# Patient Record
Sex: FEMALE
[De-identification: names, ages, dates, MRNs, and addresses within clinical notes are randomized per-mention and may not be internally consistent; named-entity substitution may affect disease eponyms.]

---

## 2021-07-02 PROBLEM — Z00.00 ENCOUNTER FOR PREVENTIVE HEALTH EXAMINATION: Status: ACTIVE | Noted: 2021-07-02

## 2023-04-28 ENCOUNTER — ASOB RESULT (OUTPATIENT)
Age: 23
End: 2023-04-28

## 2023-04-28 ENCOUNTER — APPOINTMENT (OUTPATIENT)
Dept: ANTEPARTUM | Facility: CLINIC | Age: 23
End: 2023-04-28
Payer: COMMERCIAL

## 2023-04-28 PROCEDURE — 76813 OB US NUCHAL MEAS 1 GEST: CPT

## 2023-04-28 PROCEDURE — 76801 OB US < 14 WKS SINGLE FETUS: CPT | Mod: 59

## 2023-06-30 ENCOUNTER — APPOINTMENT (OUTPATIENT)
Dept: ANTEPARTUM | Facility: CLINIC | Age: 23
End: 2023-06-30
Payer: COMMERCIAL

## 2023-06-30 ENCOUNTER — ASOB RESULT (OUTPATIENT)
Age: 23
End: 2023-06-30

## 2023-06-30 PROCEDURE — 76811 OB US DETAILED SNGL FETUS: CPT

## 2023-09-05 ENCOUNTER — ASOB RESULT (OUTPATIENT)
Age: 23
End: 2023-09-05

## 2023-09-05 ENCOUNTER — APPOINTMENT (OUTPATIENT)
Dept: ANTEPARTUM | Facility: CLINIC | Age: 23
End: 2023-09-05
Payer: COMMERCIAL

## 2023-09-05 PROCEDURE — 76816 OB US FOLLOW-UP PER FETUS: CPT

## 2023-10-11 ENCOUNTER — APPOINTMENT (OUTPATIENT)
Dept: MATERNAL FETAL MEDICINE | Facility: CLINIC | Age: 23
End: 2023-10-11
Payer: COMMERCIAL

## 2023-10-11 ENCOUNTER — ASOB RESULT (OUTPATIENT)
Age: 23
End: 2023-10-11

## 2023-10-11 DIAGNOSIS — O24.419 GESTATIONAL DIABETES MELLITUS IN PREGNANCY, UNSPECIFIED CONTROL: ICD-10-CM

## 2023-10-11 PROCEDURE — G0109 DIAB MANAGE TRN IND/GROUP: CPT

## 2023-10-11 RX ORDER — BLOOD-GLUCOSE METER
KIT MISCELLANEOUS 4 TIMES DAILY
Qty: 2 | Refills: 2 | Status: ACTIVE | COMMUNITY
Start: 2023-10-11 | End: 1900-01-01

## 2023-10-11 RX ORDER — LANCETS 33 GAUGE
EACH MISCELLANEOUS
Qty: 4 | Refills: 2 | Status: ACTIVE | COMMUNITY
Start: 2023-10-11 | End: 1900-01-01

## 2023-10-11 RX ORDER — URINE ACETONE TEST STRIPS
STRIP MISCELLANEOUS
Qty: 1 | Refills: 2 | Status: ACTIVE | COMMUNITY
Start: 2023-10-11 | End: 1900-01-01

## 2023-10-11 RX ORDER — BLOOD-GLUCOSE METER
W/DEVICE KIT MISCELLANEOUS
Qty: 1 | Refills: 0 | Status: ACTIVE | COMMUNITY
Start: 2023-10-11 | End: 1900-01-01

## 2023-10-17 ENCOUNTER — ASOB RESULT (OUTPATIENT)
Age: 23
End: 2023-10-17

## 2023-10-17 ENCOUNTER — APPOINTMENT (OUTPATIENT)
Dept: ANTEPARTUM | Facility: CLINIC | Age: 23
End: 2023-10-17
Payer: COMMERCIAL

## 2023-10-17 PROCEDURE — 76816 OB US FOLLOW-UP PER FETUS: CPT

## 2023-10-18 ENCOUNTER — ASOB RESULT (OUTPATIENT)
Age: 23
End: 2023-10-18

## 2023-10-18 ENCOUNTER — APPOINTMENT (OUTPATIENT)
Dept: MATERNAL FETAL MEDICINE | Facility: CLINIC | Age: 23
End: 2023-10-18
Payer: COMMERCIAL

## 2023-10-18 PROCEDURE — G0108 DIAB MANAGE TRN  PER INDIV: CPT | Mod: 95

## 2023-10-26 ENCOUNTER — TRANSCRIPTION ENCOUNTER (OUTPATIENT)
Age: 23
End: 2023-10-26

## 2023-10-26 ENCOUNTER — APPOINTMENT (OUTPATIENT)
Dept: MATERNAL FETAL MEDICINE | Facility: CLINIC | Age: 23
End: 2023-10-26
Payer: COMMERCIAL

## 2023-10-26 ENCOUNTER — ASOB RESULT (OUTPATIENT)
Age: 23
End: 2023-10-26

## 2023-10-26 PROCEDURE — G0108 DIAB MANAGE TRN  PER INDIV: CPT

## 2023-11-03 ENCOUNTER — APPOINTMENT (OUTPATIENT)
Dept: MATERNAL FETAL MEDICINE | Facility: CLINIC | Age: 23
End: 2023-11-03

## 2023-11-14 ENCOUNTER — APPOINTMENT (OUTPATIENT)
Dept: ANTEPARTUM | Facility: CLINIC | Age: 23
End: 2023-11-14

## 2024-10-16 ENCOUNTER — APPOINTMENT (OUTPATIENT)
Dept: ANTEPARTUM | Facility: CLINIC | Age: 24
End: 2024-10-16

## 2024-10-22 ENCOUNTER — OUTPATIENT (OUTPATIENT)
Dept: OUTPATIENT SERVICES | Facility: HOSPITAL | Age: 24
LOS: 1 days | End: 2024-10-22
Payer: COMMERCIAL

## 2024-10-22 VITALS
SYSTOLIC BLOOD PRESSURE: 132 MMHG | TEMPERATURE: 99 F | HEART RATE: 101 BPM | DIASTOLIC BLOOD PRESSURE: 73 MMHG | RESPIRATION RATE: 17 BRPM

## 2024-10-22 DIAGNOSIS — O26.899 OTHER SPECIFIED PREGNANCY RELATED CONDITIONS, UNSPECIFIED TRIMESTER: ICD-10-CM

## 2024-10-22 DIAGNOSIS — Z78.9 OTHER SPECIFIED HEALTH STATUS: Chronic | ICD-10-CM

## 2024-10-22 PROCEDURE — G0463: CPT

## 2024-10-22 PROCEDURE — 59025 FETAL NON-STRESS TEST: CPT | Mod: 26

## 2024-10-22 PROCEDURE — 76819 FETAL BIOPHYS PROFIL W/O NST: CPT

## 2024-10-22 PROCEDURE — 59025 FETAL NON-STRESS TEST: CPT

## 2024-10-22 PROCEDURE — 76819 FETAL BIOPHYS PROFIL W/O NST: CPT | Mod: 26

## 2024-10-22 PROCEDURE — 99212 OFFICE O/P EST SF 10 MIN: CPT | Mod: 25

## 2024-10-22 NOTE — OB PROVIDER TRIAGE NOTE - NSOBPROVIDERNOTE_OBGYN_ALL_OB_FT
23 y/o  38 1/7 weeks GA (TOMASA: 2024) sent in by Dr. Steinberg for NST/BPP. Patient had NST/BPP at another hospital with non reassuring results.     - VSS  - NST Reactive  - Pt not in active labor  - BPP Pending 25 y/o  38 1/7 weeks GA (TOMASA: 2024) sent in by Dr. Steinberg for NST/BPP. Patient had NST/BPP at another hospital with non reassuring results.     - VSS  - NST Reactive  - Pt not in active labor  - BPP Pending  - Spoke with Dr. Steinberg via Telephone 23 y/o  38 1/7 weeks GA (TOMASA: 2024) sent in by Dr. Steinberg for NST/BPP. Patient had NST/BPP at another hospital with non reassuring results.     - VSS  - NST Reactive  - Pt not in active labor  - BPP Pending  - Spoke with Dr. Steinberg via Telephone      addendum:   reviewed nst with Dr. Betancourt cat I, reactive  bpp  jeff 8.13 posterior placenta, cephalic, dw Dr. Steinberg is attempting to arrange an appt with [facility], patient has been going [facility] labor room triage for prenatal care and established care with Dr. Steinberg at 33 weeks. fundal height measuring 36-37 weeks, today patient is 38 weeks  will instructed patient to come to labor room on Thursday 10/24 for nst/bpp at 9am if she does not have an appt with mfm  please drink 2 Liters of water daily  labor precautions given  kick count instructions given  return to labor room with any maternal/fetal concerns

## 2024-10-22 NOTE — OB RN TRIAGE NOTE - NS_TRIAGEADDITIONAL COMMENTS_OBGYN_ALL_OB_FT
Official BPP was reviewed by ANH Cordon. Same is 8/8. with CARMELO of 8.13. Case was discussed by Dr Dugan.. Patient will be discharged home with labor precautions and instructions to follow up here on Thursday 10/24/24 for . Reinforced labor precautions and kick counts. Patient is advised to return to labor room if SROM, vaginal bleeding, Decreased fetal movements and or uterine contractions every 3-5 minutes x one hour.

## 2024-10-22 NOTE — OB PROVIDER TRIAGE NOTE - NSHPPHYSICALEXAM_GEN_ALL_CORE
Vital Signs Last 24 Hrs  T(C): 37 (22 Oct 2024 16:13), Max: 37 (22 Oct 2024 16:13)  T(F): 98.6 (22 Oct 2024 16:13), Max: 98.6 (22 Oct 2024 16:13)  HR: 101 (22 Oct 2024 16:13) (101 - 101)  BP: 132/73 (22 Oct 2024 16:13) (132/73 - 132/73)  RR: 17 (22 Oct 2024 16:13) (17 - 17)    Physical Exam:  General: A & O x 3, in NAD  Abdomen: Gravid Uterus, soft, nontender  Extremities: Nontender, no discoloration or swelling    FHT:  Baseline 140, moderate variability, Category 1 tracing, reactive

## 2024-10-22 NOTE — OB PROVIDER TRIAGE NOTE - NS_CONTRACTPATTER_OBGYN_ALL_OB
[Normocephalic] : normocephalic [Atraumatic] : atraumatic [EOMI] : extra ocular movement intact [Sclera nonicteric] : sclera nonicteric [Supple] : supple [No Supraclavicular Adenopathy] : no supraclavicular adenopathy [Clear to Auscultation Bilat] : clear to auscultation bilaterally [Normal Sinus Rhythm] : normal sinus rhythm [No Rubs] : no pericardial rub [Examined in the supine and seated position] : examined in the supine and seated position [Symmetrical] : symmetrical [Bra Size: ___] : Bra Size: [unfilled] [No dominant masses] : no dominant masses in right breast  [No dominant masses] : no dominant masses left breast [No Nipple Retraction] : no left nipple retraction [No Nipple Discharge] : no left nipple discharge [No Axillary Lymphadenopathy] : no left axillary lymphadenopathy [Soft] : abdomen soft [Not Tender] : non-tender Irregular [No Edema] : no edema [No Rashes] : no rashes [No Ulceration] : no ulceration

## 2024-10-22 NOTE — OB PROVIDER TRIAGE NOTE - ADDITIONAL INSTRUCTIONS
cat I, reactive  bpp 8/8 jeff 8.13 posterior placenta, cephalic  will instructed patient to come to labor room on Thursday 10/24 for nst/bpp at 9am if she does not have an appt with m  please drink 2 Liters of water daily  labor precautions given  kick count instructions given  return to labor room with any maternal/fetal concerns

## 2024-10-22 NOTE — OB RN TRIAGE NOTE - FALL HARM RISK - UNIVERSAL INTERVENTIONS
Bed in lowest position, wheels locked, appropriate side rails in place/Call bell, personal items and telephone in reach/Instruct patient to call for assistance before getting out of bed or chair/Non-slip footwear when patient is out of bed/[place] to call system/Purposeful Proactive Rounding/Room/bathroom lighting operational, light cord in reach

## 2024-10-22 NOTE — OB PROVIDER TRIAGE NOTE - HISTORY OF PRESENT ILLNESS
23 y/o  38 1/7 weeks GA (TOMASA: ) sent in by Dr. Steinberg for NST/BPP. Patient had NST/BPP at another hospital with non reassuring results.   Patient has +FM, denies vaginal bleeding, denies LOF, denies contractions.  Denies fever, chills, headache, SOB, chest pain, N/V/D.     Patient seen under PA Wendy.     PNC: Dr. Steinberg, complicated by Anemia (treated with po iron and iron infusion x1 yesterday)  OBHx:  in  at 38 weeks 4 days. TOP in 2019 with meds, TOP in  with D & C  GYNHx: H/o fibroid, h/o HSV 1 & 2 in , h/o chlamydia, h/o trichomonas  PMHx: Sjogrens Syndrome  PSHx: none  Allergies: Peanuts, pollen  Meds: PNV, Vit B12, po iron, iron infusion x1 yesterday

## 2024-10-24 ENCOUNTER — INPATIENT (INPATIENT)
Facility: HOSPITAL | Age: 24
LOS: 3 days | Discharge: HOME | DRG: 951 | End: 2024-10-28
Attending: STUDENT IN AN ORGANIZED HEALTH CARE EDUCATION/TRAINING PROGRAM | Admitting: STUDENT IN AN ORGANIZED HEALTH CARE EDUCATION/TRAINING PROGRAM
Payer: COMMERCIAL

## 2024-10-24 VITALS
RESPIRATION RATE: 15 BRPM | SYSTOLIC BLOOD PRESSURE: 131 MMHG | HEART RATE: 101 BPM | DIASTOLIC BLOOD PRESSURE: 83 MMHG | TEMPERATURE: 98 F

## 2024-10-24 DIAGNOSIS — Z78.9 OTHER SPECIFIED HEALTH STATUS: Chronic | ICD-10-CM

## 2024-10-24 DIAGNOSIS — Z34.80 ENCOUNTER FOR SUPERVISION OF OTHER NORMAL PREGNANCY, UNSPECIFIED TRIMESTER: ICD-10-CM

## 2024-10-24 DIAGNOSIS — O26.899 OTHER SPECIFIED PREGNANCY RELATED CONDITIONS, UNSPECIFIED TRIMESTER: ICD-10-CM

## 2024-10-24 LAB
ANISOCYTOSIS BLD QL: SLIGHT — SIGNIFICANT CHANGE UP
APTT BLD: 29.9 SEC — SIGNIFICANT CHANGE UP (ref 24.5–35.6)
BASOPHILS # BLD AUTO: 0 K/UL — SIGNIFICANT CHANGE UP (ref 0–0.2)
BASOPHILS NFR BLD AUTO: 0 % — SIGNIFICANT CHANGE UP (ref 0–2)
BLD GP AB SCN SERPL QL: SIGNIFICANT CHANGE UP
EOSINOPHIL # BLD AUTO: 0.12 K/UL — SIGNIFICANT CHANGE UP (ref 0–0.5)
EOSINOPHIL NFR BLD AUTO: 2 % — SIGNIFICANT CHANGE UP (ref 0–6)
FIBRINOGEN PPP-MCNC: 676 MG/DL — HIGH (ref 200–475)
HCT VFR BLD CALC: 32.6 % — LOW (ref 34.5–45)
HGB BLD-MCNC: 10.1 G/DL — LOW (ref 11.5–15.5)
HIV 1 & 2 AB SERPL IA.RAPID: SIGNIFICANT CHANGE UP
INR BLD: 0.88 RATIO — SIGNIFICANT CHANGE UP (ref 0.85–1.16)
LG PLATELETS BLD QL AUTO: SLIGHT — SIGNIFICANT CHANGE UP
LYMPHOCYTES # BLD AUTO: 0.89 K/UL — LOW (ref 1–3.3)
LYMPHOCYTES # BLD AUTO: 15 % — SIGNIFICANT CHANGE UP (ref 13–44)
MANUAL SMEAR VERIFICATION: SIGNIFICANT CHANGE UP
MCHC RBC-ENTMCNC: 24 PG — LOW (ref 27–34)
MCHC RBC-ENTMCNC: 31 GM/DL — LOW (ref 32–36)
MCV RBC AUTO: 77.4 FL — LOW (ref 80–100)
METAMYELOCYTES # FLD: 1 % — HIGH (ref 0–0)
MICROCYTES BLD QL: SLIGHT — SIGNIFICANT CHANGE UP
MONOCYTES # BLD AUTO: 0.35 K/UL — SIGNIFICANT CHANGE UP (ref 0–0.9)
MONOCYTES NFR BLD AUTO: 6 % — SIGNIFICANT CHANGE UP (ref 2–14)
MYELOCYTES NFR BLD: 1 % — HIGH (ref 0–0)
NEUTROPHILS # BLD AUTO: 4.37 K/UL — SIGNIFICANT CHANGE UP (ref 1.8–7.4)
NEUTROPHILS NFR BLD AUTO: 72 % — SIGNIFICANT CHANGE UP (ref 43–77)
NEUTS BAND # BLD: 2 % — SIGNIFICANT CHANGE UP (ref 0–8)
NRBC # BLD: 0 /100 WBCS — SIGNIFICANT CHANGE UP (ref 0–0)
PLAT MORPH BLD: NORMAL — SIGNIFICANT CHANGE UP
PLATELET # BLD AUTO: 258 K/UL — SIGNIFICANT CHANGE UP (ref 150–400)
POIKILOCYTOSIS BLD QL AUTO: SLIGHT — SIGNIFICANT CHANGE UP
PROTHROM AB SERPL-ACNC: 10.3 SEC — SIGNIFICANT CHANGE UP (ref 9.9–13.4)
RBC # BLD: 4.21 M/UL — SIGNIFICANT CHANGE UP (ref 3.8–5.2)
RBC # FLD: 20.2 % — HIGH (ref 10.3–14.5)
RBC BLD AUTO: ABNORMAL
SPHEROCYTES BLD QL SMEAR: SLIGHT — SIGNIFICANT CHANGE UP
VARIANT LYMPHS # BLD: 1 % — SIGNIFICANT CHANGE UP (ref 0–6)
WBC # BLD: 5.9 K/UL — SIGNIFICANT CHANGE UP (ref 3.8–10.5)
WBC # FLD AUTO: 5.9 K/UL — SIGNIFICANT CHANGE UP (ref 3.8–10.5)

## 2024-10-24 PROCEDURE — 76815 OB US LIMITED FETUS(S): CPT | Mod: 26

## 2024-10-24 PROCEDURE — 76819 FETAL BIOPHYS PROFIL W/O NST: CPT | Mod: 26

## 2024-10-24 RX ORDER — OXYTOCIN IN D5W-0.2% SODIUM CL 15/250 ML
167 PLASTIC BAG, INJECTION (ML) INTRAVENOUS
Qty: 30 | Refills: 0 | Status: DISCONTINUED | OUTPATIENT
Start: 2024-10-24 | End: 2024-10-28

## 2024-10-24 RX ORDER — OXYTOCIN IN D5W-0.2% SODIUM CL 15/250 ML
2 PLASTIC BAG, INJECTION (ML) INTRAVENOUS
Qty: 30 | Refills: 0 | Status: DISCONTINUED | OUTPATIENT
Start: 2024-10-24 | End: 2024-10-25

## 2024-10-24 RX ORDER — CITRIC ACID/SODIUM CITRATE 334-500MG
15 SOLUTION, ORAL ORAL EVERY 6 HOURS
Refills: 0 | Status: DISCONTINUED | OUTPATIENT
Start: 2024-10-24 | End: 2024-10-25

## 2024-10-24 RX ORDER — CHLORHEXIDINE GLUCONATE 40 MG/ML
1 SOLUTION TOPICAL DAILY
Refills: 0 | Status: DISCONTINUED | OUTPATIENT
Start: 2024-10-24 | End: 2024-10-25

## 2024-10-24 RX ORDER — INFLUENZ VIR VAC TV P-SURF2003 15MCG/.5ML
0.5 SYRINGE (ML) INTRAMUSCULAR ONCE
Refills: 0 | Status: DISCONTINUED | OUTPATIENT
Start: 2024-10-24 | End: 2024-10-28

## 2024-10-24 RX ADMIN — Medication 2 MILLIUNIT(S)/MIN: at 21:10

## 2024-10-24 RX ADMIN — Medication 125 MILLILITER(S): at 18:30

## 2024-10-24 NOTE — OB PROVIDER TRIAGE NOTE - NSHPPHYSICALEXAM_GEN_ALL_CORE
Vital Signs Last 24 Hrs  T(C): 36.9 (24 Oct 2024 14:13), Max: 36.9 (24 Oct 2024 14:13)  T(F): 98.5 (24 Oct 2024 14:13), Max: 98.5 (24 Oct 2024 14:13)  HR: 101 (24 Oct 2024 14:13) (101 - 101)  BP: 131/83 (24 Oct 2024 14:13) (131/83 - 131/83)  BP(mean): --  RR: 15 (24 Oct 2024 14:13) (15 - 15)  SpO2: --    gen aox3, not in acute distress, appears well  abd gravid, soft, nontender  sve indeterminant baseline, moderate variability  toco no contractions

## 2024-10-24 NOTE — OB PROVIDER H&P - HISTORY OF PRESENT ILLNESS
OB PA H&P    23 y/o  38 weeks 3 days TOMASA  LMP  presented for  testing. Patient was able to get an appt with M tomorrow.   Patient has +FM, denies vaginal bleeding, denies LOF, denies contractions.    PNC: Dr. Steinberg, late to care at 33wks, was going to [facility] labor room for prenatal care  complicated by Anemia (treated with po iron and iron infusion  OBHx:  in  at 38 weeks 4 days. TOP in 2019 with meds, TOP in  with D & C  GYNHx: H/o fibroid, h/o HSV 1 , h/o chlamydia, h/o trichomonas  PMHx: Sjogrens Syndrome  PSHx: none  Allergies: Peanuts, pollen  Meds: PNV, Vit B12, po iron, iron infusion x1 10/22

## 2024-10-24 NOTE — OB PROVIDER H&P - ASSESSMENT
a/p 23 y/o  38 weeks 3 days with nonreassuring nst, bpp 8/10 jeff 8.1, stable  instructed by Dr. Hollingsworth house attending to admit for delivery  reviewed findings with Dr. Steinberg, instructed to augment with pitocin  informed consent obtained by Dr. Hollingsworth  obtain prenatal record  nst reviewed by Dr. Elier GIL, agrees with plan  continuous monitoring  npo  venodynes  epidural for pain mngt upon request

## 2024-10-24 NOTE — OB PROVIDER H&P - NSHPLABSRESULTS_GEN_ALL_CORE
< from: US OB Position Fetus(es) (10.24.24 @ 16:31) >        INTERPRETATION:  CLINICAL INFORMATION: bpp, carmelo, fetal well being    GESTATIONAL AGE: 38 weeks 3 days.    COMPARISON: OB ultrasound 10/22/2024    TECHNIQUE:  Transabdominal pelvic sonogram only.    FINDINGS:    Single live Intrauterine gestation is present.  Fetal position: CEPHALIC presentation.  Placenta location: POSTERIOR .  Amniotic fluid volume: CARMELO 8.1 cm.  Fetal motion is seen in real-time and the fetal heart rate measures 126   bpm.    Fetal anatomy and umbilical cord were not evaluated.    Measurements are as follows:    BPD: 9.0 cmcorresponding to 36 weeks, 4 days.  HC: 32.4 cm corresponding to 36 weeks, 5 days.  AC: 32.9 cm corresponding to 36 weeks, 6 days.  FL: 7.3 cm corresponding to 37 weeks, 4 days.    Estimated fetal weight: 3081 g,   6 lbs. 13 oz.    Estimated fetal weight percent: 28.3%.    The biophysical profile is 8/8.    Body Movement: 2  (score 2) Greater or equal than 3 body/limb movements  (score 0) Less than 3 body/limb movements    Tone: 2  (score 2) One or more episodes extension/flexion  (score 0) No episodes extension/flexion    Breathing movements: 2  (score 2) Any breathing movements or hiccups  (score 0) No breathing movements    Amniotic fluid: 2  (score 2) One pocket >= 2cm in 2 perpendicular planes  (score 0) <2 X 2 cm pocket    Additional comments: None.    IMPRESSION:  Single live intrauterine gestation measuring 37 weeks and 0 days   corresponding to an estimated delivery date of 11/14/2024.    The biophysical profile is 8/8.    CARMELO on the low range.    < end of copied text >

## 2024-10-24 NOTE — OB PROVIDER H&P - NSPPHRISKSTATUS_OBGYN_ALL_OB
Quality 130: Documentation Of Current Medications In The Medical Record: Current Medications Documented
Quality 474: Zoster Vaccination Status: Shingrix Vaccination not Administered or Previously Received, Reason not Otherwise Specified
Detail Level: Detailed
Low Risk

## 2024-10-24 NOTE — OB PROVIDER H&P - NSHPPHYSICALEXAM_GEN_ALL_CORE
Vital Signs Last 24 Hrs  T(C): 36.9 (24 Oct 2024 17:57), Max: 36.9 (24 Oct 2024 14:13)  T(F): 98.4 (24 Oct 2024 17:57), Max: 98.5 (24 Oct 2024 14:13)  HR: 100 (24 Oct 2024 17:57) (100 - 101)  BP: 131/83 (24 Oct 2024 17:57) (131/83 - 131/83)  BP(mean): --  RR: 16 (24 Oct 2024 17:57) (15 - 16)  SpO2: --    gen aox3, not in acute distress, appears comfortable  abd gravid, soft, nontender  sve 2-3/70/-2/vtx/int  nst indeterminant baseline, moderate variability  toco occasional

## 2024-10-24 NOTE — OB PROVIDER TRIAGE NOTE - NSOBPROVIDERNOTE_OBGYN_ALL_OB_FT
a/p 25y/o  38 weeks 3 days for  testing,   prolonged nst  bpp with efw  reviewed nst with Dr. Edel sarabia attending

## 2024-10-24 NOTE — OB PROVIDER H&P - PROBLEM SELECTOR PLAN 1
a/p 25 y/o  38 weeks 3 days with nonreassuring nst, bpp 8/10, stable  instructed by Dr. Edel sarabia attending to admit for delivery  reviewed findings with Dr. Steinberg, instructed to augment with pitocin  informed consent obtained by Dr. Hollingsworth  obtain prenatal record  nst reviewed by Dr. Elier GIL, agrees with plan  continuous monitoring  npo  venodynes  epidural for pain mngt upon request

## 2024-10-24 NOTE — OB PROVIDER TRIAGE NOTE - HISTORY OF PRESENT ILLNESS
OB PA Triage Note     25 y/o  38 weeks 3 days TOMASA:  LMP  presented for  testing. Patient was able to get an appt with M tomorrow.   Patient has +FM, denies vaginal bleeding, denies LOF, denies contractions.          PNC: Dr. Steinberg, late to care, was going to [facility] labor room for prenatal care  complicated by Anemia (treated with po iron and iron infusion x1 yesterday)  OBHx:  in  at 38 weeks 4 days. TOP in  with meds, TOP in  with D & C  GYNHx: H/o fibroid, h/o HSV 1 & 2 in , h/o chlamydia, h/o trichomonas  PMHx: Sjogrens Syndrome  PSHx: none  Allergies: Peanuts, pollen  Meds: PNV, Vit B12, po iron, iron infusion x1 yesterday   OB PA Triage Note     23 y/o  38 weeks 3 days TOMASA  LMP  presented for  testing. Patient was able to get an appt with M tomorrow.   Patient has +FM, denies vaginal bleeding, denies LOF, denies contractions.    PNC: Dr. Steinberg, late to care, was going to [facility] labor room for prenatal care  complicated by Anemia (treated with po iron and iron infusion  OBHx:  in  at 38 weeks 4 days. TOP in  with meds, TOP in  with D & C  GYNHx: H/o fibroid, h/o HSV 1 & 2 in , h/o chlamydia, h/o trichomonas  PMHx: Sjogrens Syndrome  PSHx: none  Allergies: Peanuts, pollen  Meds: PNV, Vit B12, po iron, iron infusion x1 10/22

## 2024-10-25 ENCOUNTER — APPOINTMENT (OUTPATIENT)
Dept: ANTEPARTUM | Facility: CLINIC | Age: 24
End: 2024-10-25

## 2024-10-25 DIAGNOSIS — D64.9 ANEMIA, UNSPECIFIED: ICD-10-CM

## 2024-10-25 DIAGNOSIS — O99.891 OTHER SPECIFIED DISEASES AND CONDITIONS COMPLICATING PREGNANCY: ICD-10-CM

## 2024-10-25 DIAGNOSIS — D21.9 BENIGN NEOPLASM OF CONNECTIVE AND OTHER SOFT TISSUE, UNSPECIFIED: ICD-10-CM

## 2024-10-25 DIAGNOSIS — O36.8330 MATERNAL CARE FOR ABNORMALITIES OF THE FETAL HEART RATE OR RHYTHM, THIRD TRIMESTER, NOT APPLICABLE OR UNSPECIFIED: ICD-10-CM

## 2024-10-25 DIAGNOSIS — M35.00 SJOGREN SYNDROME, UNSPECIFIED: ICD-10-CM

## 2024-10-25 DIAGNOSIS — Z3A.38 38 WEEKS GESTATION OF PREGNANCY: ICD-10-CM

## 2024-10-25 DIAGNOSIS — O99.013 ANEMIA COMPLICATING PREGNANCY, THIRD TRIMESTER: ICD-10-CM

## 2024-10-25 LAB
HBV SURFACE AG SERPL QL IA: SIGNIFICANT CHANGE UP
HCV AB S/CO SERPL IA: 0.13 S/CO — SIGNIFICANT CHANGE UP (ref 0–0.99)
HCV AB SERPL-IMP: SIGNIFICANT CHANGE UP
HIV 1+2 AB+HIV1 P24 AG SERPL QL IA: SIGNIFICANT CHANGE UP
MEV IGG SER-ACNC: >300 AU/ML — SIGNIFICANT CHANGE UP
MEV IGG+IGM SER-IMP: POSITIVE — SIGNIFICANT CHANGE UP
MUV AB SER-ACNC: POSITIVE — SIGNIFICANT CHANGE UP
MUV IGG FLD-ACNC: 145 AU/ML — SIGNIFICANT CHANGE UP
RUBV IGG SER-ACNC: 8.83 INDEX — SIGNIFICANT CHANGE UP
RUBV IGG SER-IMP: POSITIVE — SIGNIFICANT CHANGE UP
T PALLIDUM AB TITR SER: NEGATIVE — SIGNIFICANT CHANGE UP

## 2024-10-25 PROCEDURE — 74018 RADEX ABDOMEN 1 VIEW: CPT | Mod: 26

## 2024-10-25 PROCEDURE — 88305 TISSUE EXAM BY PATHOLOGIST: CPT | Mod: 26

## 2024-10-25 RX ORDER — DEXAMETHASONE 1.5 MG 1.5 MG/1
4 TABLET ORAL EVERY 6 HOURS
Refills: 0 | Status: DISCONTINUED | OUTPATIENT
Start: 2024-10-25 | End: 2024-10-28

## 2024-10-25 RX ORDER — ONDANSETRON HYDROCHLORIDE 2 MG/ML
4 INJECTION, SOLUTION INTRAMUSCULAR; INTRAVENOUS EVERY 6 HOURS
Refills: 0 | Status: COMPLETED | OUTPATIENT
Start: 2024-10-25 | End: 2024-10-26

## 2024-10-25 RX ORDER — MAGNESIUM HYDROXIDE 1200 MG/15ML
30 SUSPENSION ORAL
Refills: 0 | Status: DISCONTINUED | OUTPATIENT
Start: 2024-10-25 | End: 2024-10-28

## 2024-10-25 RX ORDER — SIMETHICONE 80 MG/1
80 TABLET, CHEWABLE ORAL EVERY 4 HOURS
Refills: 0 | Status: DISCONTINUED | OUTPATIENT
Start: 2024-10-25 | End: 2024-10-28

## 2024-10-25 RX ORDER — IBUPROFEN 200 MG
600 TABLET ORAL EVERY 6 HOURS
Refills: 0 | Status: COMPLETED | OUTPATIENT
Start: 2024-10-25 | End: 2025-09-23

## 2024-10-25 RX ORDER — CEFAZOLIN SODIUM 1 G
2000 VIAL (EA) INJECTION ONCE
Refills: 0 | Status: COMPLETED | OUTPATIENT
Start: 2024-10-25 | End: 2024-10-25

## 2024-10-25 RX ORDER — CLOSTRIDIUM TETANI TOXOID ANTIGEN (FORMALDEHYDE INACTIVATED), CORYNEBACTERIUM DIPHTHERIAE TOXOID ANTIGEN (FORMALDEHYDE INACTIVATED), BORDETELLA PERTUSSIS TOXOID ANTIGEN (GLUTARALDEHYDE INACTIVATED), BORDETELLA PERTUSSIS FILAMENTOUS HEMAGGLUTININ ANTIGEN (FORMALDEHYDE INACTIVATED), BORDETELLA PERTUSSIS PERTACTIN ANTIGEN, AND BORDETELLA PERTUSSIS FIMBRIAE 2/3 ANTIGEN 5; 2; 2.5; 5; 3; 5 [LF]/.5ML; [LF]/.5ML; UG/.5ML; UG/.5ML; UG/.5ML; UG/.5ML
0.5 INJECTION, SUSPENSION INTRAMUSCULAR ONCE
Refills: 0 | Status: DISCONTINUED | OUTPATIENT
Start: 2024-10-25 | End: 2024-10-28

## 2024-10-25 RX ORDER — FERROUS SULFATE 325(65) MG
325 TABLET ORAL DAILY
Refills: 0 | Status: DISCONTINUED | OUTPATIENT
Start: 2024-10-25 | End: 2024-10-28

## 2024-10-25 RX ORDER — DIPHENHYDRAMINE HCL 12.5MG/5ML
25 ELIXIR ORAL EVERY 6 HOURS
Refills: 0 | Status: DISCONTINUED | OUTPATIENT
Start: 2024-10-25 | End: 2024-10-28

## 2024-10-25 RX ORDER — ASCORBIC ACID 500 MG
500 TABLET ORAL DAILY
Refills: 0 | Status: DISCONTINUED | OUTPATIENT
Start: 2024-10-25 | End: 2024-10-28

## 2024-10-25 RX ORDER — KETOROLAC TROMETHAMINE 30 MG/ML
30 INJECTION INTRAMUSCULAR; INTRAVENOUS EVERY 6 HOURS
Refills: 0 | Status: DISCONTINUED | OUTPATIENT
Start: 2024-10-25 | End: 2024-10-26

## 2024-10-25 RX ORDER — NALOXONE HYDROCHLORIDE 1 MG/ML
0.1 INJECTION, SOLUTION INTRAMUSCULAR; INTRAVENOUS; SUBCUTANEOUS
Refills: 0 | Status: DISCONTINUED | OUTPATIENT
Start: 2024-10-25 | End: 2024-10-28

## 2024-10-25 RX ORDER — MODIFIED LANOLIN
1 OINTMENT (GRAM) TOPICAL EVERY 6 HOURS
Refills: 0 | Status: DISCONTINUED | OUTPATIENT
Start: 2024-10-25 | End: 2024-10-28

## 2024-10-25 RX ORDER — OXYCODONE HYDROCHLORIDE 30 MG/1
5 TABLET ORAL
Refills: 0 | Status: COMPLETED | OUTPATIENT
Start: 2024-10-25 | End: 2024-11-01

## 2024-10-25 RX ORDER — MORPHINE SULFATE 30 MG/1
3 TABLET, EXTENDED RELEASE ORAL ONCE
Refills: 0 | Status: DISCONTINUED | OUTPATIENT
Start: 2024-10-25 | End: 2024-10-28

## 2024-10-25 RX ORDER — HEPARIN SODIUM 10000 [USP'U]/ML
5000 INJECTION INTRAVENOUS; SUBCUTANEOUS EVERY 12 HOURS
Refills: 0 | Status: DISCONTINUED | OUTPATIENT
Start: 2024-10-26 | End: 2024-10-28

## 2024-10-25 RX ORDER — ACETAMINOPHEN 500 MG
975 TABLET ORAL EVERY 6 HOURS
Refills: 0 | Status: DISCONTINUED | OUTPATIENT
Start: 2024-10-25 | End: 2024-10-28

## 2024-10-25 RX ADMIN — Medication 975 MILLIGRAM(S): at 18:31

## 2024-10-25 RX ADMIN — KETOROLAC TROMETHAMINE 30 MILLIGRAM(S): 30 INJECTION INTRAMUSCULAR; INTRAVENOUS at 21:35

## 2024-10-25 RX ADMIN — KETOROLAC TROMETHAMINE 30 MILLIGRAM(S): 30 INJECTION INTRAMUSCULAR; INTRAVENOUS at 14:50

## 2024-10-25 RX ADMIN — Medication 975 MILLIGRAM(S): at 19:31

## 2024-10-25 RX ADMIN — Medication 0.25 MILLIGRAM(S): at 11:09

## 2024-10-25 RX ADMIN — KETOROLAC TROMETHAMINE 30 MILLIGRAM(S): 30 INJECTION INTRAMUSCULAR; INTRAVENOUS at 23:30

## 2024-10-25 RX ADMIN — SIMETHICONE 80 MILLIGRAM(S): 80 TABLET, CHEWABLE ORAL at 18:33

## 2024-10-25 RX ADMIN — Medication 2 MILLIUNIT(S)/MIN: at 07:42

## 2024-10-25 RX ADMIN — Medication 100 MILLIGRAM(S): at 11:25

## 2024-10-25 RX ADMIN — KETOROLAC TROMETHAMINE 30 MILLIGRAM(S): 30 INJECTION INTRAMUSCULAR; INTRAVENOUS at 14:34

## 2024-10-25 NOTE — OB PROVIDER DELIVERY SUMMARY - NSPROVIDERDELIVERYNOTE_OBGYN_ALL_OB_FT
sectoin without complications, tight nuchal corn, baby with Apgars 9&9 at 1&5 min. Good hemostasis at end of procedure.

## 2024-10-25 NOTE — OB RN DELIVERY SUMMARY - NS_SEPSISRSKCALC_OBGYN_ALL_OB_FT
EOS calculated successfully. EOS Risk Factor: 0.5/1000 live births ([facility] national incidence); GA=38w4d; Temp=98.5; ROM=0.433; GBS='Unknown'; Antibiotics='No antibiotics or any antibiotics < 2 hrs prior to birth'

## 2024-10-25 NOTE — OB PROVIDER LABOR PROGRESS NOTE - NS_OBIHIFHRDETAILS_OBGYN_ALL_OB_FT
FHT: baseline: 130, moderate variability, + accels, no decels
Baseline 130, moderate variability +accels, no decels
fh
fh baseline 135 moderate variability +accels
FHT: baseline: 130, moderate variability, + accels,  two variable decelerations to 60 bpms lasting 1-2 minutes with spontaneous return to baseline

## 2024-10-25 NOTE — CHART NOTE - NSCHARTNOTEFT_GEN_A_CORE
Called by ANH Machuca at 11:19am. Was told that pt had a fetal bradycardiac event s/p AROM. Was informed that FHR went back to baseline and fetal deceleration was for 7 minutes. SV3: 7/80/-2. clear. Informed Brigette to bring pt to the main OR and monitor FHR in the OR. Was informed that house officer, Dr. Sánchez is present. Informed Brigette that I would leave my office and that I am 10 to 15 minutes away. Called a few minutes later stating that patient is brought to OR. Arrived to L&D at 11:36am.  Informed that baby was delivered via stat C/S section. Reviewed tracing with Brigette. Spoke to pt and pt's family.  All questions were answered. pt voiced understanding.

## 2024-10-25 NOTE — OB PROVIDER LABOR PROGRESS NOTE - NS_SUBJECTIVE/OBJECTIVE_OBGYN_ALL_OB_FT
PA Tracing Note     FHT: baseline: 150, moderate variability, + accels, noted variable decel  contractions q 2-4 minutes     NO complaints at this time   VE deferred
Patient seen resting comfortably.  No complaints.      VE: deferred
patient seen at bedside  resting comfortably  epidural in place
sve 6.5/80/-1  arom clear fluid  iupc inserted  ise malfunctiong  external fetal heart inserted
Patient evaluated at bedside   Reports increased pain with contractions and back pain     SVE: 4/70/-2
Patient evaluated at bedside   Resting comfortably   s/p two variable decelerations to 60 bpms lasting 1-2 minutes with spontaneous return to baseline   Patient was repositioned by primary RN at bedside   patient denies complaints at this time     SVE: 3/70/-3/vertex/intact   patient tolerated exam well

## 2024-10-25 NOTE — OB PROVIDER LABOR PROGRESS NOTE - ASSESSMENT
a/p siup @ 38w4d augmentation for early labor,  - will restart pitocin  - continue close maternal/fetal monitoring  d/w Dr Maryan de leon reviewed by no Salinas attending
Patient undergoing mIOL     -Continue monitoring fht, toco, vitals  -Pain management as needed  -Pitocin for augmentation  -Continue current care    Dr Steinberg aware 
23 yo  @ 38.4 weeks, undergoing mIOL, efw 3081, GBS neg  pitocin at 14 miliunits at this time     -Continue close maternal and fetal monitoring   -patient requesting epidural for pain management, anesthesia notified   -continue pitocin for augmentation per protocol   -Continue current care  - NST reviewed     Dr Steinberg aware 
25 yo  @ 38.3 weeks, admitted for augmentation of early labor and nonreassuring NST, efw 3081, GBS neg   pitocin discontinued at time of second decel    - continue close maternal and fetal monitoring   - pain management per patient request   - restart pitocin per protocol   - NST reviewed   - Dr. Steinberg aware

## 2024-10-25 NOTE — OB RN DELIVERY SUMMARY - NSSELHIDDEN_OBGYN_ALL_OB_FT
[NS_DeliveryAttending1_OBGYN_ALL_OB_FT:[id number]==],[NS_DeliveryAssist1_OBGYN_ALL_OB_FT:[id number]=]

## 2024-10-25 NOTE — OB RN INTRAOPERATIVE NOTE - NSSELHIDDEN_OBGYN_ALL_OB_FT
[NS_DeliveryAttending1_OBGYN_ALL_OB_FT:[id number]==],[NS_DeliveryAssist1_OBGYN_ALL_OB_FT:[id number]=]

## 2024-10-25 NOTE — OB PROVIDER DELIVERY SUMMARY - NSSELHIDDEN_OBGYN_ALL_OB_FT
[NS_DeliveryAttending1_OBGYN_ALL_OB_FT:[id number]==],[NS_DeliveryAssist1_OBGYN_ALL_OB_FT:[id number]=]

## 2024-10-26 DIAGNOSIS — D62 ACUTE POSTHEMORRHAGIC ANEMIA: ICD-10-CM

## 2024-10-26 LAB
BASOPHILS # BLD AUTO: 0.01 K/UL — SIGNIFICANT CHANGE UP (ref 0–0.2)
BASOPHILS NFR BLD AUTO: 0.1 % — SIGNIFICANT CHANGE UP (ref 0–2)
EOSINOPHIL # BLD AUTO: 0.07 K/UL — SIGNIFICANT CHANGE UP (ref 0–0.5)
EOSINOPHIL NFR BLD AUTO: 0.9 % — SIGNIFICANT CHANGE UP (ref 0–6)
HCT VFR BLD CALC: 27 % — LOW (ref 34.5–45)
HGB BLD-MCNC: 8.4 G/DL — LOW (ref 11.5–15.5)
IMM GRANULOCYTES NFR BLD AUTO: 1.2 % — HIGH (ref 0–0.9)
LYMPHOCYTES # BLD AUTO: 1.08 K/UL — SIGNIFICANT CHANGE UP (ref 1–3.3)
LYMPHOCYTES # BLD AUTO: 14.4 % — SIGNIFICANT CHANGE UP (ref 13–44)
MCHC RBC-ENTMCNC: 24.3 PG — LOW (ref 27–34)
MCHC RBC-ENTMCNC: 31.1 GM/DL — LOW (ref 32–36)
MCV RBC AUTO: 78 FL — LOW (ref 80–100)
MONOCYTES # BLD AUTO: 0.77 K/UL — SIGNIFICANT CHANGE UP (ref 0–0.9)
MONOCYTES NFR BLD AUTO: 10.3 % — SIGNIFICANT CHANGE UP (ref 2–14)
NEUTROPHILS # BLD AUTO: 5.48 K/UL — SIGNIFICANT CHANGE UP (ref 1.8–7.4)
NEUTROPHILS NFR BLD AUTO: 73.1 % — SIGNIFICANT CHANGE UP (ref 43–77)
NRBC # BLD: 0 /100 WBCS — SIGNIFICANT CHANGE UP (ref 0–0)
PLATELET # BLD AUTO: 197 K/UL — SIGNIFICANT CHANGE UP (ref 150–400)
RBC # BLD: 3.46 M/UL — LOW (ref 3.8–5.2)
RBC # FLD: 20.8 % — HIGH (ref 10.3–14.5)
WBC # BLD: 7.5 K/UL — SIGNIFICANT CHANGE UP (ref 3.8–10.5)
WBC # FLD AUTO: 7.5 K/UL — SIGNIFICANT CHANGE UP (ref 3.8–10.5)

## 2024-10-26 RX ORDER — IBUPROFEN 200 MG
600 TABLET ORAL EVERY 6 HOURS
Refills: 0 | Status: DISCONTINUED | OUTPATIENT
Start: 2024-10-26 | End: 2024-10-28

## 2024-10-26 RX ORDER — FAMOTIDINE 10 MG/ML
20 INJECTION INTRAVENOUS
Refills: 0 | Status: DISCONTINUED | OUTPATIENT
Start: 2024-10-26 | End: 2024-10-28

## 2024-10-26 RX ADMIN — Medication 975 MILLIGRAM(S): at 00:12

## 2024-10-26 RX ADMIN — KETOROLAC TROMETHAMINE 30 MILLIGRAM(S): 30 INJECTION INTRAMUSCULAR; INTRAVENOUS at 09:00

## 2024-10-26 RX ADMIN — Medication 975 MILLIGRAM(S): at 18:07

## 2024-10-26 RX ADMIN — MAGNESIUM HYDROXIDE 30 MILLILITER(S): 1200 SUSPENSION ORAL at 12:52

## 2024-10-26 RX ADMIN — Medication 975 MILLIGRAM(S): at 05:32

## 2024-10-26 RX ADMIN — KETOROLAC TROMETHAMINE 30 MILLIGRAM(S): 30 INJECTION INTRAMUSCULAR; INTRAVENOUS at 03:10

## 2024-10-26 RX ADMIN — Medication 975 MILLIGRAM(S): at 12:53

## 2024-10-26 RX ADMIN — SIMETHICONE 80 MILLIGRAM(S): 80 TABLET, CHEWABLE ORAL at 12:53

## 2024-10-26 RX ADMIN — Medication 600 MILLIGRAM(S): at 21:11

## 2024-10-26 RX ADMIN — Medication 975 MILLIGRAM(S): at 13:53

## 2024-10-26 RX ADMIN — KETOROLAC TROMETHAMINE 30 MILLIGRAM(S): 30 INJECTION INTRAMUSCULAR; INTRAVENOUS at 04:10

## 2024-10-26 RX ADMIN — FAMOTIDINE 20 MILLIGRAM(S): 10 INJECTION INTRAVENOUS at 18:07

## 2024-10-26 RX ADMIN — HEPARIN SODIUM 5000 UNIT(S): 10000 INJECTION INTRAVENOUS; SUBCUTANEOUS at 12:52

## 2024-10-26 RX ADMIN — Medication 975 MILLIGRAM(S): at 01:17

## 2024-10-26 RX ADMIN — KETOROLAC TROMETHAMINE 30 MILLIGRAM(S): 30 INJECTION INTRAMUSCULAR; INTRAVENOUS at 10:00

## 2024-10-26 RX ADMIN — HEPARIN SODIUM 5000 UNIT(S): 10000 INJECTION INTRAVENOUS; SUBCUTANEOUS at 00:12

## 2024-10-26 RX ADMIN — Medication 975 MILLIGRAM(S): at 06:32

## 2024-10-26 RX ADMIN — Medication 500 MILLIGRAM(S): at 12:53

## 2024-10-26 RX ADMIN — Medication 600 MILLIGRAM(S): at 22:10

## 2024-10-26 RX ADMIN — Medication 325 MILLIGRAM(S): at 12:53

## 2024-10-26 RX ADMIN — Medication 975 MILLIGRAM(S): at 19:07

## 2024-10-26 RX ADMIN — ONDANSETRON HYDROCHLORIDE 4 MILLIGRAM(S): 2 INJECTION, SOLUTION INTRAMUSCULAR; INTRAVENOUS at 06:11

## 2024-10-27 LAB
BASOPHILS # BLD AUTO: 0.01 K/UL — SIGNIFICANT CHANGE UP (ref 0–0.2)
BASOPHILS NFR BLD AUTO: 0.1 % — SIGNIFICANT CHANGE UP (ref 0–2)
EOSINOPHIL # BLD AUTO: 0.13 K/UL — SIGNIFICANT CHANGE UP (ref 0–0.5)
EOSINOPHIL NFR BLD AUTO: 1.6 % — SIGNIFICANT CHANGE UP (ref 0–6)
HCT VFR BLD CALC: 24.2 % — LOW (ref 34.5–45)
HGB BLD-MCNC: 7.6 G/DL — LOW (ref 11.5–15.5)
IMM GRANULOCYTES NFR BLD AUTO: 1.2 % — HIGH (ref 0–0.9)
LYMPHOCYTES # BLD AUTO: 1.25 K/UL — SIGNIFICANT CHANGE UP (ref 1–3.3)
LYMPHOCYTES # BLD AUTO: 15 % — SIGNIFICANT CHANGE UP (ref 13–44)
MCHC RBC-ENTMCNC: 24.4 PG — LOW (ref 27–34)
MCHC RBC-ENTMCNC: 31.4 GM/DL — LOW (ref 32–36)
MCV RBC AUTO: 77.8 FL — LOW (ref 80–100)
MONOCYTES # BLD AUTO: 0.81 K/UL — SIGNIFICANT CHANGE UP (ref 0–0.9)
MONOCYTES NFR BLD AUTO: 9.7 % — SIGNIFICANT CHANGE UP (ref 2–14)
NEUTROPHILS # BLD AUTO: 6.03 K/UL — SIGNIFICANT CHANGE UP (ref 1.8–7.4)
NEUTROPHILS NFR BLD AUTO: 72.4 % — SIGNIFICANT CHANGE UP (ref 43–77)
NRBC # BLD: 0 /100 WBCS — SIGNIFICANT CHANGE UP (ref 0–0)
PLATELET # BLD AUTO: 195 K/UL — SIGNIFICANT CHANGE UP (ref 150–400)
RBC # BLD: 3.11 M/UL — LOW (ref 3.8–5.2)
RBC # FLD: 21.8 % — HIGH (ref 10.3–14.5)
WBC # BLD: 8.33 K/UL — SIGNIFICANT CHANGE UP (ref 3.8–10.5)
WBC # FLD AUTO: 8.33 K/UL — SIGNIFICANT CHANGE UP (ref 3.8–10.5)

## 2024-10-27 RX ORDER — IBUPROFEN 200 MG
1 TABLET ORAL
Qty: 20 | Refills: 0
Start: 2024-10-27 | End: 2024-10-31

## 2024-10-27 RX ORDER — OXYCODONE HYDROCHLORIDE 30 MG/1
5 TABLET ORAL
Refills: 0 | Status: DISCONTINUED | OUTPATIENT
Start: 2024-10-27 | End: 2024-10-28

## 2024-10-27 RX ORDER — PRENATAL VIT/IRON FUM/FOLIC AC 60 MG-1 MG
1 TABLET ORAL
Qty: 30 | Refills: 5
Start: 2024-10-27 | End: 2025-04-24

## 2024-10-27 RX ORDER — GUAIFENESIN 100 MG/5ML
200 LIQUID ORAL EVERY 6 HOURS
Refills: 0 | Status: DISCONTINUED | OUTPATIENT
Start: 2024-10-27 | End: 2024-10-28

## 2024-10-27 RX ORDER — SENNA 187 MG
2 TABLET ORAL
Qty: 60 | Refills: 0
Start: 2024-10-27 | End: 2024-11-25

## 2024-10-27 RX ORDER — ACETAMINOPHEN 500 MG
2 TABLET ORAL
Qty: 30 | Refills: 0
Start: 2024-10-27 | End: 2024-10-31

## 2024-10-27 RX ORDER — DOCUSATE SODIUM 100 MG
1 CAPSULE ORAL
Qty: 60 | Refills: 0
Start: 2024-10-27 | End: 2024-11-25

## 2024-10-27 RX ADMIN — FAMOTIDINE 20 MILLIGRAM(S): 10 INJECTION INTRAVENOUS at 06:05

## 2024-10-27 RX ADMIN — Medication 975 MILLIGRAM(S): at 11:25

## 2024-10-27 RX ADMIN — GUAIFENESIN 200 MILLIGRAM(S): 100 LIQUID ORAL at 01:24

## 2024-10-27 RX ADMIN — HEPARIN SODIUM 5000 UNIT(S): 10000 INJECTION INTRAVENOUS; SUBCUTANEOUS at 12:23

## 2024-10-27 RX ADMIN — Medication 325 MILLIGRAM(S): at 12:22

## 2024-10-27 RX ADMIN — Medication 600 MILLIGRAM(S): at 13:52

## 2024-10-27 RX ADMIN — Medication 600 MILLIGRAM(S): at 12:22

## 2024-10-27 RX ADMIN — Medication 975 MILLIGRAM(S): at 00:01

## 2024-10-27 RX ADMIN — Medication 600 MILLIGRAM(S): at 06:05

## 2024-10-27 RX ADMIN — Medication 600 MILLIGRAM(S): at 20:30

## 2024-10-27 RX ADMIN — GUAIFENESIN 200 MILLIGRAM(S): 100 LIQUID ORAL at 10:24

## 2024-10-27 RX ADMIN — Medication 975 MILLIGRAM(S): at 01:10

## 2024-10-27 RX ADMIN — SIMETHICONE 80 MILLIGRAM(S): 80 TABLET, CHEWABLE ORAL at 12:23

## 2024-10-27 RX ADMIN — Medication 500 MILLIGRAM(S): at 12:23

## 2024-10-27 RX ADMIN — Medication 600 MILLIGRAM(S): at 07:26

## 2024-10-27 RX ADMIN — Medication 975 MILLIGRAM(S): at 10:25

## 2024-10-27 RX ADMIN — HEPARIN SODIUM 5000 UNIT(S): 10000 INJECTION INTRAVENOUS; SUBCUTANEOUS at 00:21

## 2024-10-27 RX ADMIN — Medication 600 MILLIGRAM(S): at 19:37

## 2024-10-27 NOTE — DISCHARGE NOTE OB - NS MD DC FALL RISK RISK
For information on Fall & Injury Prevention, visit: [email or web address] OR  [email or web address] OR  https://www.cdc.gov/steadi/patient.html

## 2024-10-27 NOTE — DISCHARGE NOTE OB - PATIENT PORTAL LINK FT
You can access the FollowMyHealth Patient Portal offered by [facility] by registering at the following website: [email or web address]. By joining [employer]’s FollowMyHealth portal, you will also be able to view your health information using other applications (apps) compatible with our system.

## 2024-10-27 NOTE — DISCHARGE NOTE OB - CARE PROVIDER_API CALL
Crystal Steinberg  Obstetrics and Gynecology  [address] [place] [address]  Phone: [phone]  Fax: [phone]  Established Patient  Follow Up Time: 1 week

## 2024-10-27 NOTE — DISCHARGE NOTE OB - MEDICATION SUMMARY - MEDICATIONS TO TAKE
I will START or STAY ON the medications listed below when I get home from the hospital:    ibuprofen 600 mg oral tablet  -- 1 tab(s) by mouth every 6 hours as needed for  moderate pain  -- Indication: For For pain    Tylenol Extra Strength 500 mg oral tablet  -- 2 tab(s) by mouth every 8 hours as needed for  mild pain  -- Indication: For For pain    Prenatal Plus oral tablet  -- 1 tab(s) by mouth once a day  -- Indication: For Supplement    senna leaf extract oral tablet  -- 2 tab(s) by mouth once a day as needed for  constipation  -- Indication: For For constipation    Colace 100 mg oral capsule  -- 1 cap(s) by mouth 2 times a day as needed for  constipation  -- Indication: For For constipation

## 2024-10-27 NOTE — PROGRESS NOTE ADULT - PROBLEM SELECTOR PLAN 2
Take iron and prenatal vitamins. Eat iron fortified foods.
take iron, folic acid, vitamin C, and prenatal vitamins. eat iron fortified food

## 2024-10-27 NOTE — DISCHARGE NOTE OB - MATERIALS PROVIDED
Vaccinations/[facility]  Screening Program/  Immunization Record/Breastfeeding Log/Bottle Feeding Log/Breastfeeding Mother’s Support Group Information/Guide to Postpartum Care/[facility] Hearing Screen Program/Back To Sleep Handout/Shaken Baby Prevention Handout/Breastfeeding Guide and Packet/Birth Certificate Instructions/Discharge Medication Information for Patients and Families Pocket Guide/Tdap Vaccination (VIS Pub Date: 2012)

## 2024-10-27 NOTE — DISCHARGE NOTE OB - CARE PLAN
1 Principal Discharge DX:	 delivery delivered  Assessment and plan of treatment:	no sex nothing in vagina no heavy lifting no pushing no straining no strenuous activities  pain medication as needed; stool softener; dulcolax as needed if constipated  walk for exercise: helps recovery   continue prenatal vitamins daily especially whole course of breastfeeding  see your OB in the office for follow up post partum check call the office set up appointment in 1-2weeks  clean wound daily with soap and water; please note wound for redness or swelling; if noted go to the office right away so the doctor can evaluate the wound for possible wound infection  Secondary Diagnosis:	ABLA (acute blood loss anemia)  Assessment and plan of treatment:	take iron, folic acid, vitamin C, and prenatal vitamins. eat iron fortified food

## 2024-10-27 NOTE — DISCHARGE NOTE OB - PLAN OF CARE
no sex nothing in vagina no heavy lifting no pushing no straining no strenuous activities  pain medication as needed; stool softener; dulcolax as needed if constipated  walk for exercise: helps recovery   continue prenatal vitamins daily especially whole course of breastfeeding  see your OB in the office for follow up post partum check call the office set up appointment in 1-2weeks  clean wound daily with soap and water; please note wound for redness or swelling; if noted go to the office right away so the doctor can evaluate the wound for possible wound infection take iron, folic acid, vitamin C, and prenatal vitamins. eat iron fortified food

## 2024-10-27 NOTE — DISCHARGE NOTE OB - HOSPITAL COURSE
pt admitted for non reassuring nst for augmentation of early labor  s/p STAT c/s for cat II tracing remote from delivery  uncomplicated post partum course  stable for discharge

## 2024-10-27 NOTE — DISCHARGE NOTE OB - FINANCIAL ASSISTANCE
[facility] provides services at a reduced cost to those who are determined to be eligible through [facility]’s financial assistance program. Information regarding [facility]’s financial assistance program can be found by going to [email or web address] or by calling [phone].

## 2024-10-28 ENCOUNTER — APPOINTMENT (OUTPATIENT)
Dept: ANTEPARTUM | Facility: CLINIC | Age: 24
End: 2024-10-28

## 2024-10-28 VITALS
HEART RATE: 81 BPM | DIASTOLIC BLOOD PRESSURE: 73 MMHG | SYSTOLIC BLOOD PRESSURE: 116 MMHG | RESPIRATION RATE: 18 BRPM | OXYGEN SATURATION: 100 % | TEMPERATURE: 98 F

## 2024-10-28 LAB
BASOPHILS # BLD AUTO: 0.02 K/UL — SIGNIFICANT CHANGE UP (ref 0–0.2)
BASOPHILS NFR BLD AUTO: 0.3 % — SIGNIFICANT CHANGE UP (ref 0–2)
EOSINOPHIL # BLD AUTO: 0.19 K/UL — SIGNIFICANT CHANGE UP (ref 0–0.5)
EOSINOPHIL NFR BLD AUTO: 2.8 % — SIGNIFICANT CHANGE UP (ref 0–6)
HCT VFR BLD CALC: 24.8 % — LOW (ref 34.5–45)
HGB BLD-MCNC: 7.7 G/DL — LOW (ref 11.5–15.5)
IMM GRANULOCYTES NFR BLD AUTO: 1.5 % — HIGH (ref 0–0.9)
LYMPHOCYTES # BLD AUTO: 1.19 K/UL — SIGNIFICANT CHANGE UP (ref 1–3.3)
LYMPHOCYTES # BLD AUTO: 17.8 % — SIGNIFICANT CHANGE UP (ref 13–44)
MCHC RBC-ENTMCNC: 24.9 PG — LOW (ref 27–34)
MCHC RBC-ENTMCNC: 31 GM/DL — LOW (ref 32–36)
MCV RBC AUTO: 80.3 FL — SIGNIFICANT CHANGE UP (ref 80–100)
MONOCYTES # BLD AUTO: 0.55 K/UL — SIGNIFICANT CHANGE UP (ref 0–0.9)
MONOCYTES NFR BLD AUTO: 8.2 % — SIGNIFICANT CHANGE UP (ref 2–14)
NEUTROPHILS # BLD AUTO: 4.65 K/UL — SIGNIFICANT CHANGE UP (ref 1.8–7.4)
NEUTROPHILS NFR BLD AUTO: 69.4 % — SIGNIFICANT CHANGE UP (ref 43–77)
NRBC # BLD: 0 /100 WBCS — SIGNIFICANT CHANGE UP (ref 0–0)
PLATELET # BLD AUTO: 227 K/UL — SIGNIFICANT CHANGE UP (ref 150–400)
RBC # BLD: 3.09 M/UL — LOW (ref 3.8–5.2)
RBC # FLD: 21.9 % — HIGH (ref 10.3–14.5)
WBC # BLD: 6.7 K/UL — SIGNIFICANT CHANGE UP (ref 3.8–10.5)
WBC # FLD AUTO: 6.7 K/UL — SIGNIFICANT CHANGE UP (ref 3.8–10.5)

## 2024-10-28 PROCEDURE — 59050 FETAL MONITOR W/REPORT: CPT

## 2024-10-28 PROCEDURE — 74018 RADEX ABDOMEN 1 VIEW: CPT

## 2024-10-28 PROCEDURE — 86850 RBC ANTIBODY SCREEN: CPT

## 2024-10-28 PROCEDURE — 85610 PROTHROMBIN TIME: CPT

## 2024-10-28 PROCEDURE — 86780 TREPONEMA PALLIDUM: CPT

## 2024-10-28 PROCEDURE — 87340 HEPATITIS B SURFACE AG IA: CPT

## 2024-10-28 PROCEDURE — 85025 COMPLETE CBC W/AUTO DIFF WBC: CPT

## 2024-10-28 PROCEDURE — 85384 FIBRINOGEN ACTIVITY: CPT

## 2024-10-28 PROCEDURE — 86703 HIV-1/HIV-2 1 RESULT ANTBDY: CPT

## 2024-10-28 PROCEDURE — 86900 BLOOD TYPING SEROLOGIC ABO: CPT

## 2024-10-28 PROCEDURE — 76819 FETAL BIOPHYS PROFIL W/O NST: CPT

## 2024-10-28 PROCEDURE — 86762 RUBELLA ANTIBODY: CPT

## 2024-10-28 PROCEDURE — 36415 COLL VENOUS BLD VENIPUNCTURE: CPT

## 2024-10-28 PROCEDURE — 85730 THROMBOPLASTIN TIME PARTIAL: CPT

## 2024-10-28 PROCEDURE — 86735 MUMPS ANTIBODY: CPT

## 2024-10-28 PROCEDURE — 86765 RUBEOLA ANTIBODY: CPT

## 2024-10-28 PROCEDURE — 88305 TISSUE EXAM BY PATHOLOGIST: CPT

## 2024-10-28 PROCEDURE — 86923 COMPATIBILITY TEST ELECTRIC: CPT

## 2024-10-28 PROCEDURE — 86803 HEPATITIS C AB TEST: CPT

## 2024-10-28 PROCEDURE — 76815 OB US LIMITED FETUS(S): CPT

## 2024-10-28 PROCEDURE — 86901 BLOOD TYPING SEROLOGIC RH(D): CPT

## 2024-10-28 PROCEDURE — 87389 HIV-1 AG W/HIV-1&-2 AB AG IA: CPT

## 2024-10-28 RX ADMIN — Medication 600 MILLIGRAM(S): at 05:54

## 2024-10-28 RX ADMIN — Medication 975 MILLIGRAM(S): at 01:07

## 2024-10-28 RX ADMIN — FAMOTIDINE 20 MILLIGRAM(S): 10 INJECTION INTRAVENOUS at 05:55

## 2024-10-28 RX ADMIN — Medication 500 MILLIGRAM(S): at 11:57

## 2024-10-28 RX ADMIN — Medication 325 MILLIGRAM(S): at 11:58

## 2024-10-28 RX ADMIN — Medication 600 MILLIGRAM(S): at 06:54

## 2024-10-28 RX ADMIN — HEPARIN SODIUM 5000 UNIT(S): 10000 INJECTION INTRAVENOUS; SUBCUTANEOUS at 00:02

## 2024-10-28 RX ADMIN — Medication 975 MILLIGRAM(S): at 11:50

## 2024-10-28 RX ADMIN — Medication 975 MILLIGRAM(S): at 12:50

## 2024-10-28 RX ADMIN — Medication 975 MILLIGRAM(S): at 00:02

## 2024-10-28 NOTE — PROGRESS NOTE ADULT - PROBLEM SELECTOR PLAN 1
A/P: POD #1 s/p urgent c/s @ 38 4/7 weeks for category II tracing remote from delivery; ABLA  -Pain management as needed  -OOB and ambulate  -f/u Rpt CBC   -Encourage breastfeeding   -d/w Dr. Valerio
-Pain management as needed  -OOB and ambulate  -f/u Rpt CBC in am  -DC junior f/u void  -Encourage breastfeeding  -d/w Dr feldman
A/P: POD #3 s/p stat primary c/s cat 2 38 weeks 4 days c/b acute blood loss anemia, stable    -repeat cbc this afternoon, if stable will plan for discharge    -d/c home with iron/vit C for anemia   -instructions verbalized  -follow up in 2weeks in office for wound check and in 6 weeks for postpartum visit  -d/w Dr. Steinberg
-Pain management as needed  -cont post op care  -OOB and ambulate  -encourage incentive spirometer use  -Encourage breastfeeding  -heparin  -d/w dr feldman

## 2024-10-28 NOTE — LACTATION INITIAL EVALUATION - POTENTIAL FOR
sore breast/s/sore nipples/engorgement/knowledge deficit/mastitis/plugged ducts/feeding confusion/latch on difficulty

## 2024-10-28 NOTE — CHART NOTE - NSCHARTNOTEFT_GEN_A_CORE
OB PA Focused Note    rpt cbc, stable                          7.7    6.70  )-----------( 227      ( 28 Oct 2024 14:02 )             24.8       patient stable for discharge with iron and vitamin C  dw Dr. Hollis

## 2024-10-28 NOTE — LACTATION INITIAL EVALUATION - LACTATION INTERVENTIONS
Patient dripping colostrum. Expressed colostrum noted in several collection containers throughout her room. Mother taught benefits of breastfeeding, positioning and latch techniques, signs of good latch, milk transfer, feeding cues, cluster feedings and signs of satiety.  Reinforced importance of unlimited feeding on Cue at least 8-12X per 24 hours and of diaper count to assess for adequate intake. Mom requests formula. Explored reason and discussed risks of supplementing while breastfeeding without medical reason. Mom still chooses to supplement with formula. Explained risks of using artificial nipples and discussed options for using alternative feeding methods instead of nipple.  Safe formula preparation and feeding handout provided and discussed. Encouraged patient to transition to EBF and Free Community Breastfeeding resources given to patient - [facility] breastfeeding Warmline, Prenatal Infant Feeding Workshop, and 2x Baby Cafe Locations. Attended breastfeeding, postpartum and  care class today. Patientst's questions/concerns regarding breastfeeding, general self and 's care addressed. Educated on Shaken Baby Syndrome and Safe Sleep Practices./initiate/review safe skin-to-skin/initiate/review hand expression/initiate/review pumping guidelines and safe milk handling/initiate/review techniques for position and latch/post discharge community resources provided/review techniques to increase milk supply/review techniques to manage sore nipples/engorgement/initiate/review breast massage/compression/reviewed components of an effective feeding and at least 8 effective feedings per day required/reviewed importance of monitoring infant diapers, the breastfeeding log, and minimum output each day/reviewed benefits and recommendations for rooming in/reviewed feeding on demand/by cue at least 8 times a day/recommended follow-up with pediatrician within 24 hours of discharge/reviewed indications of inadequate milk transfer that would require supplementation

## 2024-10-28 NOTE — PROGRESS NOTE ADULT - ASSESSMENT
A/P: 25yo  pt POD #0 s/p stat c/s at 38w4d for cat II tracing remote from delivery
A/P: 25yo  pt POD #2 s/p STAT c/s cat II tracing remote from delivery @38w4d, s/p augmentation of early labor, MELANY  
A/P: POD #1 s/p urgent c/s @ 38 4/7 weeks for category II tracing remote from delivery; ABLA  -Pain management as needed  -OOB and ambulate  -f/u Rpt CBC   -Encourage breastfeeding   -d/w Dr. Valerio
A/P: POD #3 s/p stat primary c/s cat 2 38 weeks 4 days c/b acute blood loss anemia, stable    -repeat cbc this afternoon, if stable will plan for discharge    -d/c home with iron/vit C for anemia   -instructions verbalized  -follow up in 2weeks in office for wound check and in 6 weeks for postpartum visit  -d/w Dr. Steinberg

## 2024-10-28 NOTE — PROGRESS NOTE ADULT - SUBJECTIVE AND OBJECTIVE BOX
POD#3  PT seen at bedside, feel well, no SOB, dyspnea, palpitations.  Denies dizziness.  Toleratin gdiet, pain minimal, and pt is ambulating  ICU Vital Signs Last 24 Hrs  T(C): 36.8 (28 Oct 2024 05:35), Max: 36.9 (27 Oct 2024 19:36)  T(F): 98.3 (28 Oct 2024 05:35), Max: 98.4 (27 Oct 2024 19:36)  HR: 89 (28 Oct 2024 05:35) (79 - 91)  BP: 112/72 (28 Oct 2024 05:35) (112/72 - 123/78)    RR: 18 (28 Oct 2024 05:35) (18 - 18)  SpO2: 99% (28 Oct 2024 05:35) (99% - 100%)    O2 Parameters below as of 28 Oct 2024 05:35  Patient On (Oxygen Delivery Method): room air  HR: S1S2 RRR  Lungs: CTA B/L  Abdomen soft, incision clean dry  Ext: neg catalino's                        7.6    8.33  )-----------( 195      ( 27 Oct 2024 14:56 )             24.2   A/P POD#3 S/P stat c/section for non reassuring fetal tracing, acute blood loss anemia  Stable, asymptomatic  Anticipate D/C today  Feso4 325 mg po BID, Vitamin C 500 mg Po daily        
OB PA Progress Note POD#3    Patient seen at bedside resting comfortably offers no new complaints. + Ambulation, + void without difficulty, + flatus, tolerating regular diet. both breastfeeding and bottle feeding. Denies HA, CP, SOB, N/V/D,  dizziness, palpitations, worsening abdominal pain, worsening vaginal bleeding, or any other concerns.     Vital Signs Last 24 Hrs  T(C): 36.8 (28 Oct 2024 05:35), Max: 36.9 (27 Oct 2024 19:36)  T(F): 98.3 (28 Oct 2024 05:35), Max: 98.4 (27 Oct 2024 19:36)  HR: 89 (28 Oct 2024 05:35) (79 - 91)  BP: 112/72 (28 Oct 2024 05:35) (112/72 - 123/78)  BP(mean): --  RR: 18 (28 Oct 2024 05:35) (18 - 18)  SpO2: 99% (28 Oct 2024 05:35) (99% - 100%)    Parameters below as of 28 Oct 2024 05:35  Patient On (Oxygen Delivery Method): room air        Gen: A&O x 3, NAD  Chest: CTA B/L  Cardiac: S1,S2  RRR  Breast: Soft, nontender, nonengorged  Abdomen: +BS; soft; Nontender, nondistended, fundus firm, Incision C/D/I steri strips in place   Gyn: Minimal lochia  Extremities: Nontender, no worsening edema                          7.6    8.33  )-----------( 195      ( 27 Oct 2024 14:56 )             24.2       
PA POST OP NOTE    Patient seen at bedside, resting comfortably offers no new complaints. Due to ambulate, junior to be removed and due to void, tolerating reg diet.  Attempting breastfeeding.  Denies HA, CP, SOB, N/V/D, dizziness, palpitations, worsening abdominal pain, worsening vaginal bleeding.    Vital Signs Last 24 Hrs  T(C): 36.9 (25 Oct 2024 19:13), Max: 37.5 (25 Oct 2024 15:18)  T(F): 98.4 (25 Oct 2024 19:13), Max: 99.5 (25 Oct 2024 15:18)  HR: 90 (25 Oct 2024 19:13) (80 - 106)  BP: 126/75 (25 Oct 2024 19:13) (118/62 - 134/85)  RR: 16 (25 Oct 2024 19:13) (14 - 19)  SpO2: 99% (25 Oct 2024 19:13) (97% - 100%)    Parameters below as of 25 Oct 2024 19:13  Patient On (Oxygen Delivery Method): room air      Gen: A&O x 3, NAD  Chest: CTA B/L  Cardiac: S1, S2  RRR  Breast: Soft, nontender, nonengorged  Abdomen: +BS; soft; NT; ND; Dressing C/D/I  Gyn: Minimal lochia  Ext: Nontender, DTRS 2+, no worsening edema, venodynes intact                          10.1   5.90  )-----------( 258      ( 24 Oct 2024 18:30 )             32.6         
PA NOTE    Patient seen at bedside, resting comfortably with  in the room  Offers no new complaints.  + Ambulation, + void without difficulty, + flatus; no bm; tolerating regular diet.  Both breastfeeding and bottle feeding.  Denies HA, blurry vision or epigastric pain, CP, SOB, N/V/D,  dizziness, palpitations, worsening vaginal bleeding.    Vital Signs Last 24 Hrs  T(C): 36.8 (27 Oct 2024 05:30), Max: 37.3 (26 Oct 2024 19:23)  T(F): 98.2 (27 Oct 2024 05:30), Max: 99.1 (26 Oct 2024 19:23)  HR: 87 (27 Oct 2024 05:30) (86 - 99)  BP: 111/66 (27 Oct 2024 05:30) (111/66 - 134/77)  RR: 18 (27 Oct 2024 05:30) (17 - 18)  SpO2: 100% (27 Oct 2024 05:30) (98% - 100%)    Parameters below as of 27 Oct 2024 00:34  Patient On (Oxygen Delivery Method): room air      Gen: A&O x 3, NAD  Chest: CTABL  Cardiac: S1, S2, RRR  Breast: Soft, nontender, nonengorged  Abdomen: +BS; soft; Nontender, nondistended, Incision C/D/I steri strips in place   Gyn: Minimal lochia  Extremities: Nontender, DTRS 2+, no worsening edema                          8.4    7.50  )-----------( 197      ( 26 Oct 2024 06:35 )             27.0       
Patient seen at bedside resting comfortably offers no new complaints. not yet ambulating, junior just removed and has had spontaneous void.  + flatus;  no bm; tolerating reg diet. both breast and bottle feeding. Denies HA, blurry vision or epigastric pain, CP, SOB, N/V/D,  dizziness, palpitations, worsening vaginal bleeding.    Vital Signs Last 24 Hrs  T(C): 36.4 (26 Oct 2024 04:15), Max: 37.5 (25 Oct 2024 15:18)  T(F): 97.6 (26 Oct 2024 04:15), Max: 99.5 (25 Oct 2024 15:18)  HR: 73 (26 Oct 2024 04:15) (73 - 106)  BP: 106/64 (26 Oct 2024 04:15) (101/64 - 134/85)  RR: 16 (26 Oct 2024 04:15) (14 - 19)  SpO2: 98% (26 Oct 2024 04:15) (97% - 100%)    Parameters below as of 26 Oct 2024 04:15  Patient On (Oxygen Delivery Method): room air      Gen: A&O x 3, NAD  Chest: CTA B/L  Cardiac: S1,S2  RRR  Breast: Soft, nontender, nonengorged  Abdomen: soft; Nontender, nondistended; dressing removed incision C/D/I steri strips in place  Gyn: minimal lochia   Extremities: Nontender, venodynes in place                          8.4    7.50  )-----------( 197      ( 26 Oct 2024 06:35 )             27.0